# Patient Record
Sex: FEMALE | Race: WHITE
[De-identification: names, ages, dates, MRNs, and addresses within clinical notes are randomized per-mention and may not be internally consistent; named-entity substitution may affect disease eponyms.]

---

## 2017-08-12 ENCOUNTER — HEALTH MAINTENANCE LETTER (OUTPATIENT)
Age: 27
End: 2017-08-12

## 2018-04-17 ENCOUNTER — HOSPITAL ENCOUNTER (EMERGENCY)
Facility: CLINIC | Age: 28
Discharge: HOME | End: 2018-04-17
Attending: EMERGENCY MEDICINE | Admitting: EMERGENCY MEDICINE
Payer: COMMERCIAL

## 2018-04-17 ENCOUNTER — OFFICE VISIT (OUTPATIENT)
Dept: HEMATOLOGY | Facility: CLINIC | Age: 28
End: 2018-04-17
Attending: GENETIC COUNSELOR, MS
Payer: COMMERCIAL

## 2018-04-17 ENCOUNTER — OFFICE VISIT (OUTPATIENT)
Dept: HEMATOLOGY | Facility: CLINIC | Age: 28
End: 2018-04-17
Attending: INTERNAL MEDICINE
Payer: COMMERCIAL

## 2018-04-17 VITALS
HEART RATE: 76 BPM | SYSTOLIC BLOOD PRESSURE: 117 MMHG | DIASTOLIC BLOOD PRESSURE: 78 MMHG | RESPIRATION RATE: 12 BRPM | HEIGHT: 64 IN | TEMPERATURE: 98 F | WEIGHT: 151.5 LBS | OXYGEN SATURATION: 99 % | BODY MASS INDEX: 25.86 KG/M2

## 2018-04-17 VITALS
WEIGHT: 151.46 LBS | RESPIRATION RATE: 14 BRPM | TEMPERATURE: 98 F | BODY MASS INDEX: 26 KG/M2 | HEART RATE: 66 BPM | SYSTOLIC BLOOD PRESSURE: 102 MMHG | DIASTOLIC BLOOD PRESSURE: 79 MMHG | OXYGEN SATURATION: 100 %

## 2018-04-17 DIAGNOSIS — R55 SYNCOPE, UNSPECIFIED SYNCOPE TYPE: ICD-10-CM

## 2018-04-17 DIAGNOSIS — D68.51 FACTOR V LEIDEN MUTATION (H): Primary | ICD-10-CM

## 2018-04-17 LAB
ANION GAP SERPL CALCULATED.3IONS-SCNC: 9 MMOL/L (ref 3–14)
BASOPHILS # BLD AUTO: 0 10E9/L (ref 0–0.2)
BASOPHILS NFR BLD AUTO: 0.2 %
BUN SERPL-MCNC: 10 MG/DL (ref 7–30)
CALCIUM SERPL-MCNC: 8.7 MG/DL (ref 8.5–10.1)
CHLORIDE SERPL-SCNC: 107 MMOL/L (ref 94–109)
CO2 SERPL-SCNC: 26 MMOL/L (ref 20–32)
CREAT SERPL-MCNC: 0.58 MG/DL (ref 0.52–1.04)
DIFFERENTIAL METHOD BLD: NORMAL
EOSINOPHIL # BLD AUTO: 0 10E9/L (ref 0–0.7)
EOSINOPHIL NFR BLD AUTO: 0.4 %
ERYTHROCYTE [DISTWIDTH] IN BLOOD BY AUTOMATED COUNT: 12.5 % (ref 10–15)
GFR SERPL CREATININE-BSD FRML MDRD: >90 ML/MIN/1.7M2
GLUCOSE BLDC GLUCOMTR-MCNC: 96 MG/DL (ref 70–99)
GLUCOSE SERPL-MCNC: 99 MG/DL (ref 70–99)
HCG UR QL: NEGATIVE
HCT VFR BLD AUTO: 37.4 % (ref 35–47)
HGB BLD-MCNC: 12.4 G/DL (ref 11.7–15.7)
IMM GRANULOCYTES # BLD: 0 10E9/L (ref 0–0.4)
IMM GRANULOCYTES NFR BLD: 0.1 %
INTERNAL QC OK POCT: YES
LYMPHOCYTES # BLD AUTO: 1.4 10E9/L (ref 0.8–5.3)
LYMPHOCYTES NFR BLD AUTO: 17.4 %
MCH RBC QN AUTO: 29.5 PG (ref 26.5–33)
MCHC RBC AUTO-ENTMCNC: 33.2 G/DL (ref 31.5–36.5)
MCV RBC AUTO: 89 FL (ref 78–100)
MONOCYTES # BLD AUTO: 0.4 10E9/L (ref 0–1.3)
MONOCYTES NFR BLD AUTO: 5.4 %
NEUTROPHILS # BLD AUTO: 6.3 10E9/L (ref 1.6–8.3)
NEUTROPHILS NFR BLD AUTO: 76.5 %
NRBC # BLD AUTO: 0 10*3/UL
NRBC BLD AUTO-RTO: 0 /100
PLATELET # BLD AUTO: 229 10E9/L (ref 150–450)
POTASSIUM SERPL-SCNC: 3.5 MMOL/L (ref 3.4–5.3)
RBC # BLD AUTO: 4.21 10E12/L (ref 3.8–5.2)
SODIUM SERPL-SCNC: 142 MMOL/L (ref 133–144)
WBC # BLD AUTO: 8.2 10E9/L (ref 4–11)

## 2018-04-17 PROCEDURE — 93005 ELECTROCARDIOGRAM TRACING: CPT | Performed by: EMERGENCY MEDICINE

## 2018-04-17 PROCEDURE — 99205 OFFICE O/P NEW HI 60 MIN: CPT | Mod: GC | Performed by: INTERNAL MEDICINE

## 2018-04-17 PROCEDURE — G0463 HOSPITAL OUTPT CLINIC VISIT: HCPCS

## 2018-04-17 PROCEDURE — 99283 EMERGENCY DEPT VISIT LOW MDM: CPT | Performed by: EMERGENCY MEDICINE

## 2018-04-17 PROCEDURE — 81241 F5 GENE: CPT | Performed by: INTERNAL MEDICINE

## 2018-04-17 PROCEDURE — 93010 ELECTROCARDIOGRAM REPORT: CPT | Mod: Z6 | Performed by: EMERGENCY MEDICINE

## 2018-04-17 PROCEDURE — 25000125 ZZHC RX 250: Performed by: FAMILY MEDICINE

## 2018-04-17 PROCEDURE — 99283 EMERGENCY DEPT VISIT LOW MDM: CPT | Mod: 25 | Performed by: EMERGENCY MEDICINE

## 2018-04-17 PROCEDURE — 00000146 ZZHCL STATISTIC GLUCOSE BY METER IP

## 2018-04-17 PROCEDURE — 81025 URINE PREGNANCY TEST: CPT | Performed by: EMERGENCY MEDICINE

## 2018-04-17 RX ORDER — ONDANSETRON 4 MG/1
4 TABLET, ORALLY DISINTEGRATING ORAL ONCE
Status: COMPLETED | OUTPATIENT
Start: 2018-04-17 | End: 2018-04-17

## 2018-04-17 RX ORDER — EPINEPHRINE 0.3 MG/.3ML
0.3 INJECTION SUBCUTANEOUS
COMMUNITY

## 2018-04-17 RX ADMIN — ONDANSETRON 4 MG: 4 TABLET, ORALLY DISINTEGRATING ORAL at 15:27

## 2018-04-17 ASSESSMENT — PAIN SCALES - GENERAL: PAINLEVEL: NO PAIN (0)

## 2018-04-17 NOTE — ED PROVIDER NOTES
"  History     Chief Complaint   Patient presents with     Loss of Consciousness     near syncopal at clinic prior to blood draw     HPI  Karen Morales is a 27 year old female with a history of migraine headaches who [facility] for further evaluation following an episode of loss of consciousness. Patient reports she was in clinic having her blood drawn when she lost consciousness. She notes she is \"prone to fainting spells\" and has had episodes of syncope in the past, typically when she is in the shower. She has never lost consciousness when having her blood drawn before. She admits she hadn't eaten much PO prior to her appointment today and was hungry prior to her vasovagal episode. Per report, staff in the clinic said the patient's episode was a \"typical vasovagal episode\", however, did seem rather prolonged.      I have reviewed the Medications, Allergies, Past Medical and Surgical History, and Social History in the [employer] system.  Past Medical History:   Diagnosis Date     Migraine headaches      Recurrent sinusitis        No past surgical history on file.    Family History   Problem Relation Age of Onset     Hypertension Maternal Grandmother      HEART DISEASE Maternal Grandfather      triple bypass      Hypertension Maternal Grandfather      Hypertension Paternal Grandfather      Alzheimer Disease Paternal Grandfather      Unknown/Adopted Sister      adopted       Social History   Substance Use Topics     Smoking status: Never Smoker     Smokeless tobacco: Not on file     Alcohol use No       No current facility-administered medications for this encounter.      Current Outpatient Prescriptions   Medication     EPINEPHrine (EPIPEN/ADRENACLICK/OR ANY BX GENERIC EQUIV) 0.3 MG/0.3ML injection 2-pack        Allergies   Allergen Reactions     Penicillins Hives     Shellfish Allergy      Body shuts down     Sulfa Drugs Hives       Review of Systems   Constitutional: Negative for fever. "   Cardiovascular: Negative for chest pain.   Gastrointestinal: Negative for abdominal pain, nausea and vomiting.   Neurological: Positive for syncope.   All other systems reviewed and are negative.      Physical Exam   BP: 111/62  Pulse: 78  Temp: 97.8  F (36.6  C)  Resp: 14  Weight: 68.7 kg (151 lb 7.3 oz)  SpO2: 100 %      Physical Exam   Constitutional: She is oriented to person, place, and time. Vital signs are normal. She appears well-developed and well-nourished.  Non-toxic appearance. She does not appear ill. No distress.   HENT:   Head: Normocephalic and atraumatic.   Mouth/Throat: Oropharynx is clear and moist. No oropharyngeal exudate.   Eyes: Conjunctivae and EOM are normal. Pupils are equal, round, and reactive to light. No scleral icterus.   Neck: Normal range of motion. Neck supple. No JVD present. No tracheal deviation present. No thyromegaly present.   Cardiovascular: Normal rate, regular rhythm, normal heart sounds and intact distal pulses.  Exam reveals no gallop and no friction rub.    No murmur heard.  Pulmonary/Chest: Effort normal and breath sounds normal. No respiratory distress.   Abdominal: Soft. Bowel sounds are normal. She exhibits no distension and no mass. There is no tenderness.   Musculoskeletal: Normal range of motion. She exhibits no edema or tenderness.   Lymphadenopathy:     She has no cervical adenopathy.   Neurological: She is alert and oriented to person, place, and time. She has normal strength. No cranial nerve deficit or sensory deficit.   Skin: Skin is warm and dry. No rash noted. No erythema. No pallor.   Psychiatric: She has a normal mood and affect. Her behavior is normal.   Nursing note and vitals reviewed.      ED Course     ED Course     Procedures       3:21 PM  The patient was seen and examined by Dr. Raines in Room 5.          EKG Interpretation:      Interpreted by Huang Raines    Symptoms at time of EKG: syncope   Rhythm: normal sinus   Rate: 60  Axis:  normal  Ectopy: none  Conduction: normal  ST Segments/ T Waves: No ST-T wave changes  Q Waves: none  Comparison to prior: No old EKG available    Clinical Impression: normal EKG    Results for orders placed or performed during the hospital encounter of 04/17/18   CBC with platelets differential   Result Value Ref Range    WBC 8.2 4.0 - 11.0 10e9/L    RBC Count 4.21 3.8 - 5.2 10e12/L    Hemoglobin 12.4 11.7 - 15.7 g/dL    Hematocrit 37.4 35.0 - 47.0 %    MCV 89 78 - 100 fl    MCH 29.5 26.5 - 33.0 pg    MCHC 33.2 31.5 - 36.5 g/dL    RDW 12.5 10.0 - 15.0 %    Platelet Count 229 150 - 450 10e9/L    Diff Method Automated Method     % Neutrophils 76.5 %    % Lymphocytes 17.4 %    % Monocytes 5.4 %    % Eosinophils 0.4 %    % Basophils 0.2 %    % Immature Granulocytes 0.1 %    Nucleated RBCs 0 0 /100    Absolute Neutrophil 6.3 1.6 - 8.3 10e9/L    Absolute Lymphocytes 1.4 0.8 - 5.3 10e9/L    Absolute Monocytes 0.4 0.0 - 1.3 10e9/L    Absolute Eosinophils 0.0 0.0 - 0.7 10e9/L    Absolute Basophils 0.0 0.0 - 0.2 10e9/L    Abs Immature Granulocytes 0.0 0 - 0.4 10e9/L    Absolute Nucleated RBC 0.0    Basic metabolic panel   Result Value Ref Range    Sodium 142 133 - 144 mmol/L    Potassium 3.5 3.4 - 5.3 mmol/L    Chloride 107 94 - 109 mmol/L    Carbon Dioxide 26 20 - 32 mmol/L    Anion Gap 9 3 - 14 mmol/L    Glucose 99 70 - 99 mg/dL    Urea Nitrogen 10 7 - 30 mg/dL    Creatinine 0.58 0.52 - 1.04 mg/dL    GFR Estimate >90 >60 mL/min/1.7m2    GFR Estimate If Black >90 >60 mL/min/1.7m2    Calcium 8.7 8.5 - 10.1 mg/dL   Glucose by meter   Result Value Ref Range    Glucose 96 70 - 99 mg/dL   EKG 12-lead   Result Value Ref Range    Interpretation ECG Click View Image link to view waveform and result    hCG qual urine POCT   Result Value Ref Range    HCG Qual Urine Negative neg    Internal QC OK Yes             Assessments & Plan (with Medical Decision Making)     This patient presented to the emergency department after a syncopal  event while at clinic.  This occurred in the setting of getting her blood drawn.  She had not eaten prior to this and symptoms seem most consistent with a vasovagal episode.  No evidence of AV block, Alex-Parkinson-White, prolonged QT interval, Brugada syndrome or other arrhythmias on 12-lead EKG.  No clinical signs of bleeding or hypovolemia.  She is not pregnant decreasing any suspicion for ruptured ectopic pregnancy.  At this point time, I am comfortable discharging her.    I have reviewed the nursing notes.    I have reviewed the findings, diagnosis, plan and need for follow up with the patient.    Discharge Medication List as of 4/17/2018  4:54 PM          Final diagnoses:   Syncope, unspecified syncope type   I, Regla Khalil, am serving as a trained medical scribe to document services personally performed by Rubens Raines MD, based on the provider's statements to me.   I, Rubens Raines MD, was physically present and have reviewed and verified the accuracy of this note documented by Regla Khalil.      4/17/2018   [facility], [place], EMERGENCY DEPARTMENT     Huang Raines MD  04/23/18 1007

## 2018-04-17 NOTE — NURSING NOTE
Patient with family history of DVT and May-Thurner syndrome who is likely heterozygote for Factor V Leiden  VTE risk factors, signs, and symptoms reviewed.   Factor V Leiden discussed.  Plan is to verify Factor V Leiden status and return to clinic as needed.  Ms. Morales has the Centers contact information and was encouraged to call with questions or concerns.       During her blood draw process the CMA drawing the lab yelled for help. When I entered the room it appeared the patient had fainted. I secured her in the chair and begin to talk to her. She did not immediately verbally respond to my voice but did so in a few seconds. Her initial BP using the portable BP machine was 66/33. The next BP several minutes later manually was 92/54. Her blood pressure remained 90's/50's over the next 20 minutes. She was alert and oriented but very nauseated. She initially told Dr. Sutton she did not want to go to the ER but then agreed to further evaluation. She was brought to the ER by thomas joseph by myself and Jaquelin Bernstein PA-C.

## 2018-04-17 NOTE — ED AVS SNAPSHOT
[facility], Emergency Department    [address]    [place] [address]    Phone:  [phone]    Fax:  [phone]                                       Karen Morales   MRN: [id number]    Department:  [facility], Emergency Department   Date of Visit:  4/17/2018           Patient Information     Date Of Birth          1990        Your diagnoses for this visit were:     Syncope, unspecified syncope type        You were seen by Huang Raines MD.        Discharge Instructions       Please make an appointment to follow up with hematology clinic as directed.  Touch base with them to let them know that you had your blood work sent from the emergency department so they can follow-up on this.     Return to the emergency department for any problems.      Discharge References/Attachments     SYNCOPE, VASOVAGAL (ENGLISH)    TREATMENT FOR VASOVAGAL SYNCOPE (ENGLISH)    VASOVAGAL SYNCOPE, UNDERSTANDING (ENGLISH)      24 Hour Appointment Hotline       To make an appointment at any [facility], call [phone] ([phone]). If you don't have a family doctor or clinic, we will help you find one. [facility] clinics are conveniently located to serve the needs of you and your family.             Review of your medicines      Our records show that you are taking the medicines listed below. If these are incorrect, please call your family doctor or clinic.        Dose / Directions Last dose taken    adapalene 0.1 % gel   Commonly known as:  DIFFERIN   Quantity:  45 g        Prescription sent for one month and needs to be seen. Please have pt follow up before anymore refills.   Refills:  0        buPROPion 150 MG 24 hr tablet   Commonly known as:  WELLBUTRIN XL   Dose:  150 mg   Quantity:  30 tablet        Take 1 tablet by mouth every morning.   Refills:  1        EPINEPHrine 0.3 MG/0.3ML injection 2-pack   Commonly known as:  EPIPEN/ADRENACLICK/or ANY BX GENERIC EQUIV   Dose:  0.3 mg    Indication:  Life-Threatening Allergic Reaction        Inject 0.3 mg into the muscle   Refills:  0        escitalopram 10 MG tablet   Commonly known as:  LEXAPRO   Dose:  30 mg   Quantity:  270 tablet        Take 3 tablets by mouth daily. In the morning.   Refills:  0        * medroxyPROGESTERone 150 MG/ML injection   Commonly known as:  DEPO-PROVERA   Dose:  150 mg   Quantity:  1 mL        inject 1 mL into the muscle every 3 months.   Refills:  0        * medroxyPROGESTERone 150 MG/ML injection   Commonly known as:  DEPO-PROVERA   Dose:  150 mg   Quantity:  1 mL        Inject 1 mL into the muscle every 3 months.   Refills:  0        SUMAtriptan 50 MG tablet   Commonly known as:  IMITREX   Dose:  50 mg   Quantity:  9 tablet        Take 1 tablet by mouth at onset of headache. May Repeat x1 dose if needed 2 hrs after initial dose.   Refills:  1        * Notice:  This list has 2 medication(s) that are the same as other medications prescribed for you. Read the directions carefully, and ask your doctor or other care provider to review them with you.            Procedures and tests performed during your visit     Basic metabolic panel    CBC with platelets differential    EKG 12-lead    Factor 5 Leiden Mutation Analysis    Glucose by meter    Glucose monitor nursing POCT    hCG qual urine POCT      Orders Needing Specimen Collection     None      Pending Results     Date and Time Order Name Status Description    4/17/2018 1347 FACTOR 5 LEIDEN MUTATION ANALYSIS In process             Pending Culture Results     Date and Time Order Name Status Description    4/17/2018 1347 FACTOR 5 LEIDEN MUTATION ANALYSIS In process             Pending Results Instructions     If you had any lab results that were not finalized at the time of your Discharge, you can call the ED Lab Result RN at [phone]. You will be contacted by this team for any positive Lab results or changes in treatment. The nurses are available 7 days a week from  10A to 6:30P.  You can leave a message 24 hours per day and they will return your call.        Thank you for choosing [facility]       Thank you for choosing [facility] for your care. Our goal is always to provide you with excellent care. Hearing back from our patients is one way we can continue to improve our services. Please take a few minutes to complete the written survey that you may receive in the mail after you visit with us. Thank you!        [employer] Information     [employer] gives you secure access to your electronic health record. If you see a primary care provider, you can also send messages to your care team and make appointments. If you have questions, please call your primary care clinic.  If you do not have a primary care provider, please call [phone] and they will assist you.        Care EveryWhere ID     This is your Care EveryWhere ID. This could be used by other organizations to access your [facility] medical records  [id number]        Equal Access to Services     MIRZA SMITH : Blu Rowland, mis tom, ashley sherman, mika ruelas . So [facility] [phone].    ATENCIÓN: Si habla español, tiene a watters disposición servicios gratuitos de asistencia lingüística. Llame al [phone].    We comply with applicable federal civil rights laws and [place] laws. We do not discriminate on the basis of race, color, national origin, age, disability, sex, sexual orientation, or gender identity.            After Visit Summary       This is your record. Keep this with you and show to your community pharmacist(s) and doctor(s) at your next visit.

## 2018-04-17 NOTE — LETTER
April 18, 2018      Karen Morales  [address] [place] [address]              Dear Karen,      Enclosed are your recent blood glucose results. Blood glucose was normal and within the expected range.     Please continue your current plan of care. If you have questions, please call [phone]    Glucose by meter   Status:  Final result   Visible to patient:  Yes ([facility]) Order: [id number]              Newer results are available. Click to view them now.                Ref Range & Units 1d ago       Glucose 70 - 99 mg/dL 96     Comments: /RN Notified     Resulting Agency  [facility]          Specimen Collected: 04/17/18  3:26 PM Last Resulted: 04/17/18  3:38 PM                          Sincerely,      Dr. Tracy

## 2018-04-17 NOTE — DISCHARGE INSTRUCTIONS
Please make an appointment to follow up with hematology clinic as directed.  Touch base with them to let them know that you had your blood work sent from the emergency department so they can follow-up on this.     Return to the emergency department for any problems.

## 2018-04-17 NOTE — ED AVS SNAPSHOT
[facility], [place], Emergency Department    [address] [place] [address]    Phone:  [phone]    Fax:  [phone]                                       Karen Morales   MRN: [id number]    Department:  [facility], Emergency Department   Date of Visit:  4/17/2018           After Visit Summary Signature Page     I have received my discharge instructions, and my questions have been answered. I have discussed any challenges I see with this plan with the nurse or doctor.    ..........................................................................................................................................  Patient/Patient Representative Signature      ..........................................................................................................................................  Patient Representative Print Name and Relationship to Patient    ..................................................               ................................................  Date                                            Time    ..........................................................................................................................................  Reviewed by Signature/Title    ...................................................              ..............................................  Date                                                            Time

## 2018-04-17 NOTE — MR AVS SNAPSHOT
"              After Visit Summary   4/17/2018    Karen Morales    MRN: [id number]           Patient Information     Date Of Birth          1990        Visit Information        Provider Department      4/17/2018 12:30 PM Anastasiia Sutton MD [facility]        Today's Diagnoses     Factor V Leiden mutation (H)    -  1      Care Instructions    1. Return to clinic as needed.           Follow-ups after your visit        Who to contact     If you have questions or need follow up information about today's clinic visit or your schedule please contact [facility] directly at [phone].  Normal or non-critical lab and imaging results will be communicated to you by [employer], letter or phone within 4 business days after the clinic has received the results. If you do not hear from us within 7 days, please contact the clinic through [employer] or phone. If you have a critical or abnormal lab result, we will notify you by phone as soon as possible.  Submit refill requests through [employer] or call your pharmacy and they will forward the refill request to us. Please allow 3 business days for your refill to be completed.          Additional Information About Your Visit        MyChart Information     [employer] gives you secure access to your electronic health record. If you see a primary care provider, you can also send messages to your care team and make appointments. If you have questions, please call your primary care clinic.  If you do not have a primary care provider, please call [phone] and they will assist you.        Care EveryWhere ID     This is your Care EveryWhere ID. This could be used by other organizations to access your [facility] medical records  [id number]        Your Vitals Were     Pulse Temperature Respirations Height Pulse Oximetry BMI (Body Mass Index)    76 98  F (36.7  C) (Oral) 12 1.626 m (5' 4\") 99% 26 kg/m2       Blood Pressure from Last 3 " Encounters:   04/17/18 117/78   09/09/10 104/62    Weight from Last 3 Encounters:   04/17/18 68.7 kg (151 lb 8 oz)   09/09/10 74.8 kg (164 lb 12.8 oz)               Primary Care Provider Office Phone # Fax #    Donell KARY Bhagat [phone] [address]  [facility] [address]        Equal Access to Services     MIRZA SMITH : Hadii aad ku hadasho Soomaali, waaxda luqadaha, qaybta kaalmada adeegyada, waxay idiin hayaan adeeg jacobmarlakvng lausha . So [facility] [phone].    ATENCIÓN: Si rabiala jared, tiene a watters disposición servicios gratuitos de asistencia lingüística. [facility] [phone].    We comply with applicable federal civil rights laws and [place] laws. We do not discriminate on the basis of race, color, national origin, age, disability, sex, sexual orientation, or gender identity.            Thank you!     Thank you for choosing [facility]  for your care. Our goal is always to provide you with excellent care. Hearing back from our patients is one way we can continue to improve our services. Please take a few minutes to complete the written survey that you may receive in the mail after your visit with us. Thank you!             Your Updated Medication List - Protect others around you: Learn how to safely use, store and throw away your medicines at www.disposemymeds.org.          This list is accurate as of 4/17/18  1:56 PM.  Always use your most recent med list.                   Brand Name Dispense Instructions for use Diagnosis    adapalene 0.1 % gel    DIFFERIN    45 g    Prescription sent for one month and needs to be seen. Please have pt follow up before anymore refills.    Acne vulgaris       buPROPion 150 MG 24 hr tablet    WELLBUTRIN XL    30 tablet    Take 1 tablet by mouth every morning.    Major depression in complete remission (H)       EPINEPHrine 0.3 MG/0.3ML injection 2-pack    EPIPEN/ADRENACLICK/or ANY BX GENERIC EQUIV     Inject 0.3 mg  into the muscle    Factor V Leiden mutation (H)       escitalopram 10 MG tablet    LEXAPRO    270 tablet    Take 3 tablets by mouth daily. In the morning.    Major depression in complete remission (H)       * medroxyPROGESTERone 150 MG/ML injection    DEPO-PROVERA    1 mL    inject 1 mL into the muscle every 3 months.    Menorrhagia       * medroxyPROGESTERone 150 MG/ML injection    DEPO-PROVERA    1 mL    Inject 1 mL into the muscle every 3 months.    Contraception       SUMAtriptan 50 MG tablet    IMITREX    9 tablet    Take 1 tablet by mouth at onset of headache. May Repeat x1 dose if needed 2 hrs after initial dose.    Migraine       * Notice:  This list has 2 medication(s) that are the same as other medications prescribed for you. Read the directions carefully, and ask your doctor or other care provider to review them with you.

## 2018-04-17 NOTE — LETTER
April 18, 2018      Karen Morales  [address] [place] [address]              Dear Karen,    Enclosed are your recent blood glucose results. Blood glucose was normal and within expected range.     Please continue your current plan of care. If you have questions, please call [phone]    Glucose by meter   Status:  Final result   Visible to patient:  Yes (MyChart) Order: [id number]              Newer results are available. Click to view them now.                Ref Range & Units 1d ago       Glucose 70 - 99 mg/dL 96     Comments: Dr/RN Notified     Resulting Agency  [facility]          Specimen Collected: 04/17/18  3:26 PM Last Resulted: 04/17/18  3:38 PM                          Sincerely,       Jareth Tracy MD

## 2018-04-18 LAB — INTERPRETATION ECG - MUSE: NORMAL

## 2018-04-18 NOTE — PROGRESS NOTES
4/17/2018     Presenting Information: Karen Morales was seen for an evaluation at the [facility] today.  I met with her per the request of Dr. Sutton to obtain a family history and discuss the genetics and inheritance of factor V Leiden.        Personal History:   Briefly, Karen is a 27 year old woman.  Per a note from Dr. Chung in 2008, she is heterozygous for factor V Leiden and has no personal history of clotting.  Please see Dr. Sutton's note for additional details regarding her personal history.      Family History: A three generation pedigree, specific to bleeding and clotting was obtained today and scanned into the EMR. The following information is significant:     Karen's mother reportedly has factor V Leiden and a history pregnancy related DVT as well as other clotting concerns.  She is a patient of Dr. Chung's.  She reportedly also has May Thurner syndrome.     Karen has one maternal aunt with no clotting concerns.  Her two daughters (Karen's first cousins) are both reported to have factor V Leiden.  They have no history of clotting.    Karen has three maternal uncles.  One of which has a history of a leg DVT.  He lives in [facility] and limited information is known today regarding his clotting history.  His daughter has no clotting issues.  Her other two uncles have no history of clotting concerns.    Her maternal grandmother has no history of clotting and it is reported that she had negative genetic testing.    No bleeding or clotting history is known for other maternal relatives.    Karen's father is 55 years old and has no concerns regarding bleeding or clotting.    No clotting history is known for other paternal relatives.    Maternal ancestry is .  Paternal ancestry is [place] and [place]. There is no known consanguinity.     Discussion:     We reviewed autosomal dominant inheritance of factor V Leiden.  All children (both males and females) of a parent who  is heterozygous for factor V Leiden have a 50% chance of inheriting the mutation.  Those who do not inherit the gene mutation cannot pass it on to their children (i.e., it cannot skip generations).  Children could potentially inherit a second Factor V Leiden mutation from their other parent, and be homozygous.       We also discussed that testing is also available for other family members, should they choose to pursue it.  Some women make different choices about birth control if they know they have the Factor V Leiden mutation.     Plan:   1.    A three generation pedigree was obtained and scanned into the EMR.  2.    Karen will meet with Dr. Sutton today.  Additional questions or concerns were denied.     Approximate Time Spent in Consultation: 15 minutes.     Nani Hutton, Genetic Counseling Intern, scribing for Sonia Green MS, [facility]    Sonia Green MS, [facility]  Licensed Genetic Counselor  P. [phone]  F. [phone]

## 2018-04-18 NOTE — PROGRESS NOTES
Reason for consult: Patient is heterozygous for factor V Leiden mutation, concerned about starting a family    HPI:  Karen Morales is a 27 year old female who presented with concerns with regarding to starting a family as she was told that she was heterozygous for Factor V Leiden mutation. She has no history of DVT/PE. She was tested as her mother developed a pregnancy associated L LE DVT and was worked up foe the same. She was found to be heterozygous for Factor V Leiden mutation. She was also found to have May Thurner syndrome on evaluation and a venous stent was placed. She was started on Coumadin and was given it for many years. Upon stopping it, she developed DVT(2-3 episodes) ad was started on another anticoagulant, name not known.  In her family, her mother's brother has a history of LE DVT but has not been tested for Factor V. Her mother's sister's daughters are both heterozygous for Factor V mutation but with no history of DVT/PE. Her maternal grandmother was tested negative for Factor V mutation. Her maternal grandfather has not been tested.   Currently, patient is well and reports no symptoms.   However, she developed an episode of presyncope/syncope during blood draw and was shifted to ED after initial stabilisation.         Pt does not report recent infections, fevers, chills, other B-symptoms, vision problems, hearing loss, dysphagia or dysarthria, chest pain, palpitations, SOB, trauma, seizures, focal weakness    ROS: A complete ROS was otherwise negative    PERTINENT PAST MEDICAL HISTORY  Past Medical History:   Diagnosis Date     Migraine headaches      Recurrent sinusitis     Depression- treated for 5 years    No past surgical history on file.    SOCIAL / FAMILY HISTORY  Social History     Social History     Marital status: Single, engaged     Spouse name: N/A     Number of children: N/A     Years of education: N/A     Social History Main Topics     Smoking status: Past Smoker, quit 6  "months ago     Smokeless tobacco: Not on file     Alcohol use No     Drug use: No     Sexual activity: No     Other Topics Concern     Parent/Sibling W/ Cabg, Mi Or Angioplasty Before 65f 55m? Yes     Social History Narrative       Family History   Problem Relation Age of Onset     Hypertension Maternal Grandmother      HEART DISEASE Maternal Grandfather      triple bypass      Hypertension Maternal Grandfather      Hypertension Paternal Grandfather      Alzheimer Disease Paternal Grandfather      Unknown/Adopted Sister      adopted       MEDICATIONS  Current Outpatient Rx   Medication Sig Dispense Refill     EPINEPHrine (EPIPEN/ADRENACLICK/OR ANY BX GENERIC EQUIV) 0.3 MG/0.3ML injection 2-pack Inject 0.3 mg into the muscle         (Not in a hospital admission)  Current Outpatient Prescriptions   Medication Sig Dispense Refill     EPINEPHrine (EPIPEN/ADRENACLICK/OR ANY BX GENERIC EQUIV) 0.3 MG/0.3ML injection 2-pack Inject 0.3 mg into the muscle           ALLERGIES  Allergies   Allergen Reactions     Penicillins Hives     Shellfish Allergy      Body shuts down     Sulfa Drugs Hives       PHYSICAL EXAMINATION:  /78 (BP Location: Left arm, Patient Position: Sitting, Cuff Size: Adult Regular)  Pulse 76  Temp 98  F (36.7  C) (Oral)  Resp 12  Ht 1.626 m (5' 4\")  Wt 68.7 kg (151 lb 8 oz)  SpO2 99%  BMI 26 kg/m2  Constitutional: Awake and alert, appears well-developed, not in acute distress.  Eyes: No scleral icterus. Eyes exhibit no discharge.  ENT/Mouth: Oral mucosa pink and moist  Cardiovascular: Normal rate, regular rhythm, S1, S2. No murmur or rub. No LE edema.  Respiratory: No respiratory distress. Clear to auscultation bilaterally. No wheezes.  Gastrointestinal: Soft. No distension. No tenderness or guarding. No HSM/FF. BS+  Neurological: AAOX3, grossly non-focal  Psychiatric: Mentation and affect appear normal.  Skin: Skin is warm, not diaphoretic.  Hematologic/Lymphatic/Immunologic: No overt bleeding. " No lymphadenopathy    DATA:  Recent Labs   Lab Test  04/17/18   1605   NA  142   POTASSIUM  3.5   CHLORIDE  107   CO2  26   ANIONGAP  9   BUN  10   CR  0.58   GLC  99   JOCELYN  8.7     Recent Labs   Lab Test  04/17/18   1605   WBC  8.2   HGB  12.4   PLT  229   MCV  89   NEUTROPHIL  76.5     No results for input(s): BILITOTAL, ALKPHOS, ALT, AST, ALBUMIN, LDH in the last 53123 hours.  No results found for: TSH]    No results found for this or any previous visit.    IMPRESSION and RECS:  Patient did not have any records to show the presence of the reported mutation and hence a blood test to confirm the presence of the Factor V mutation was suggested. In the situation that the patient is heterozygous for the mutation with no H/O DVT/PE, we recommend no anticoagulation before or during pregnancy. The mutation itself is common in the  population and it's presence in her family with no H/O DVT indicated that it is not an important risk factor on its own to develop a DVT. Her mother, in addition had an anatomical predisposing factor, , that cannot be passed on to her.   However, we would recommend 6 weeks of prophylactic anticoagulation in the post partum period to prevent any clots in that period.   Possible agent and dose will be discussed in detail, at around 30 weeks of gestation.   Cassandra Lazo  Student, Hematology      I spent more than 1 hour of critical axis time with the patient as she passed out in the lab draw area as we were trying to draw factor V Leiden mutation testing for he her blood pressure dipped down to 60/40 . Which eventually recovered to 90/50 but we had to sent to the emergency room as she continued to be very nauseous.  Despite taking juice and crackers.   Regarding her anticoagulation she has no  personal history of clotting events, has been checked for factor V Leiden mutation she states in the past and was detected to be heterozygote her mother-had left lower extremity DVT at the time  when she was pregnant with the patient but also she had MayThurner syndrome.  I tried to reassure the patient that May Thurner syndrome syndrome is anatomical abnormality which is not genetically passed on in factor V Leiden heterozygosity is a week risk factor.  At this point when she gets pregnant we would like to hear back from her and would recommend prophylactic intensity Lovenox postpartum for 6 weeks.     I spent 60 minutes in the care of this patient >50% of which was spent in coordinating and counseling.    Anastasiia Sutton   of Medicine   Hematology and medical Oncology   [facility]

## 2018-04-19 LAB — COPATH REPORT: NORMAL

## 2018-04-23 ASSESSMENT — ENCOUNTER SYMPTOMS
VOMITING: 0
NAUSEA: 0
ABDOMINAL PAIN: 0
FEVER: 0

## 2018-04-25 NOTE — PROGRESS NOTES
Please call the patient with the results and inform that the plan remains same as discussed in clinic.   Anastasiia Sutton   of Medicine   Hematology and medical Oncology   [facility]

## 2019-11-06 ENCOUNTER — HEALTH MAINTENANCE LETTER (OUTPATIENT)
Age: 29
End: 2019-11-06

## 2020-11-29 ENCOUNTER — HEALTH MAINTENANCE LETTER (OUTPATIENT)
Age: 30
End: 2020-11-29

## 2021-09-19 ENCOUNTER — HEALTH MAINTENANCE LETTER (OUTPATIENT)
Age: 31
End: 2021-09-19

## 2022-01-09 ENCOUNTER — HEALTH MAINTENANCE LETTER (OUTPATIENT)
Age: 32
End: 2022-01-09

## 2022-11-21 ENCOUNTER — HEALTH MAINTENANCE LETTER (OUTPATIENT)
Age: 32
End: 2022-11-21

## 2023-04-16 ENCOUNTER — HEALTH MAINTENANCE LETTER (OUTPATIENT)
Age: 33
End: 2023-04-16

## 2024-05-15 ENCOUNTER — LAB REQUISITION (OUTPATIENT)
Dept: LAB | Facility: CLINIC | Age: 34
End: 2024-05-15
Payer: COMMERCIAL

## 2024-05-15 DIAGNOSIS — Z12.4 ENCOUNTER FOR SCREENING FOR MALIGNANT NEOPLASM OF CERVIX: ICD-10-CM

## 2024-05-15 PROCEDURE — G0145 SCR C/V CYTO,THINLAYER,RESCR: HCPCS | Performed by: OBSTETRICS & GYNECOLOGY

## 2024-05-15 PROCEDURE — 87624 HPV HI-RISK TYP POOLED RSLT: CPT | Performed by: OBSTETRICS & GYNECOLOGY

## 2024-05-17 LAB
HPV HR 12 DNA CVX QL NAA+PROBE: NEGATIVE
HPV16 DNA CVX QL NAA+PROBE: NEGATIVE
HPV18 DNA CVX QL NAA+PROBE: NEGATIVE
HUMAN PAPILLOMA VIRUS FINAL DIAGNOSIS: NORMAL

## 2024-05-21 LAB
BKR LAB AP GYN ADEQUACY: NORMAL
BKR LAB AP GYN INTERPRETATION: NORMAL
PATH REPORT.COMMENTS IMP SPEC: NORMAL
PATH REPORT.COMMENTS IMP SPEC: NORMAL